# Patient Record
Sex: MALE | Race: WHITE | NOT HISPANIC OR LATINO | Employment: FULL TIME | URBAN - NONMETROPOLITAN AREA
[De-identification: names, ages, dates, MRNs, and addresses within clinical notes are randomized per-mention and may not be internally consistent; named-entity substitution may affect disease eponyms.]

---

## 2024-10-28 ENCOUNTER — APPOINTMENT (OUTPATIENT)
Dept: RADIOLOGY | Facility: CLINIC | Age: 19
End: 2024-10-28
Payer: COMMERCIAL

## 2024-10-28 ENCOUNTER — OFFICE VISIT (OUTPATIENT)
Dept: URGENT CARE | Facility: CLINIC | Age: 19
End: 2024-10-28
Payer: COMMERCIAL

## 2024-10-28 VITALS
DIASTOLIC BLOOD PRESSURE: 62 MMHG | HEART RATE: 78 BPM | HEIGHT: 76 IN | BODY MASS INDEX: 21.92 KG/M2 | SYSTOLIC BLOOD PRESSURE: 130 MMHG | WEIGHT: 180 LBS | RESPIRATION RATE: 18 BRPM | TEMPERATURE: 98.4 F | OXYGEN SATURATION: 98 %

## 2024-10-28 DIAGNOSIS — M79.642 HAND PAIN, LEFT: ICD-10-CM

## 2024-10-28 DIAGNOSIS — S52.502A CLOSED FRACTURE OF DISTAL END OF LEFT RADIUS, UNSPECIFIED FRACTURE MORPHOLOGY, INITIAL ENCOUNTER: Primary | ICD-10-CM

## 2024-10-28 DIAGNOSIS — S60.222A CONTUSION OF DORSUM OF LEFT HAND: ICD-10-CM

## 2024-10-28 DIAGNOSIS — M25.532 WRIST PAIN, ACUTE, LEFT: ICD-10-CM

## 2024-10-28 PROCEDURE — 73130 X-RAY EXAM OF HAND: CPT

## 2024-10-28 PROCEDURE — G0382 LEV 3 HOSP TYPE B ED VISIT: HCPCS

## 2024-10-28 PROCEDURE — 73110 X-RAY EXAM OF WRIST: CPT

## 2024-10-28 PROCEDURE — S9083 URGENT CARE CENTER GLOBAL: HCPCS

## 2024-10-28 RX ORDER — LEVOTHYROXINE SODIUM 25 UG/1
TABLET ORAL
COMMUNITY
Start: 2024-09-29

## 2024-10-28 NOTE — PATIENT INSTRUCTIONS
Xray initial interpretation:  Left wrist - concerned for distal radius avulsion fracture  Left hand - no acute osseous abnormality   Official radiology read pending - We will only notify you if there needs to be a change in your treatment plan.     Tylenol/Ibuprofen for pain  Wear wrist brace for support  Ice 20 minutes 3-4 times per day for 3 days  Insulate the skin from the ice to prevent frostbite  Rest and Elevate  Follow up with orthopedic - referral placed today    Follow up with PCP in 3-5 days.  Proceed to  ER if symptoms worsen.    If tests are performed, our office will contact you with results only if changes need to made to the care plan discussed with you at the visit. You can review your full results on [facility].

## 2024-10-28 NOTE — PROGRESS NOTES
[facility]        NAME: Mac Lea is a 18 y.o. male  : 2005    MRN: [id number]  DATE: 2024  TIME: 5:39 PM    Assessment and Plan   Closed fracture of distal end of left radius, unspecified fracture morphology, initial encounter [S52.502A]  1. Closed fracture of distal end of left radius, unspecified fracture morphology, initial encounter  XR wrist 3+ vw left    Ambulatory Referral to Orthopedic Surgery    Orthopedic injury treatment      2. Contusion of dorsum of left hand  XR hand 3+ vw left        Xray initial interpretation:  Left wrist - concerned for distal radius avulsion fracture  Left hand - no acute osseous abnormality   Official radiology read pending - We will only notify you if there needs to be a change in your treatment plan.       Patient Instructions   Xray initial interpretation:  Left wrist - concerned for distal radius avulsion fracture  Left hand - no acute osseous abnormality   Official radiology read pending - We will only notify you if there needs to be a change in your treatment plan.     Tylenol/Ibuprofen for pain  Wear wrist brace for support  Ice 20 minutes 3-4 times per day for 3 days  Insulate the skin from the ice to prevent frostbite  Rest and Elevate  Follow up with orthopedic - referral placed today    Follow up with PCP in 3-5 days.  Proceed to  ER if symptoms worsen.    If tests are performed, our office will contact you with results only if changes need to made to the care plan discussed with you at the visit. [facility]    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  Patient reports when he fell he overextended his left hand and wrist to brace the fall.  Patient has bruising and swelling with some  "abrasions to dorsum of left hand.  Patient has decreased range of motion to left hand and left wrist.  Patient reports pain on movement of left hand and left wrist.  Patient denies any numbness or tingling in left upper extremity.  Patient has positive pulses intact in left upper extremity.    Hand Injury         Review of Systems   Review of Systems   Constitutional: Negative.    HENT: Negative.     Respiratory: Negative.     Cardiovascular: Negative.    Gastrointestinal: Negative.    Musculoskeletal:  Positive for arthralgias (left hand and left wrist) and joint swelling (left hand and wrist).   Skin:  Positive for color change (bruising to left hand) and wound.         Current Medications       Current Outpatient Medications:     levothyroxine 25 mcg tablet, TAKE 1 TABLET BY MOUTH IN THE MORNING. (AT LEAST 30 MIN PRIOR TO BREAKFAST OR OTHER MEDS)., Disp: , Rfl:     Current Allergies     Allergies as of 10/28/2024 - Reviewed 10/28/2024   Allergen Reaction Noted    Penicillins Other (See Comments) 02/14/2017            The following portions of the patient's history were reviewed and updated as appropriate: allergies, current medications, past family history, past medical history, past social history, past surgical history and problem list.     Past Medical History:   Diagnosis Date    Disease of thyroid gland        Past Surgical History:   Procedure Laterality Date    FOREARM FRACTURE SURGERY         Family History   Problem Relation Age of Onset    No Known Problems Mother          Medications have been verified.        Objective   /62   Pulse 78   Temp 98.4 °F (36.9 °C)   Resp 18   Ht 6' 4\" (1.93 m)   Wt 81.6 kg (180 lb)   SpO2 98%   BMI 21.91 kg/m²        Physical Exam     Physical Exam  Vitals and nursing note reviewed.   Constitutional:       General: He is not in acute distress.     Appearance: Normal appearance. He is not ill-appearing.   HENT:      Head: Normocephalic.      Right Ear: " External ear normal.      Left Ear: External ear normal.      Nose: Nose normal. No congestion.      Mouth/Throat:      Mouth: Mucous membranes are moist.      Pharynx: No oropharyngeal exudate or posterior oropharyngeal erythema.   Eyes:      Extraocular Movements: Extraocular movements intact.      Conjunctiva/sclera: Conjunctivae normal.      Pupils: Pupils are equal, round, and reactive to light.   Cardiovascular:      Rate and Rhythm: Normal rate and regular rhythm.      Pulses: Normal pulses.      Heart sounds: Normal heart sounds.   Pulmonary:      Effort: Pulmonary effort is normal. No respiratory distress.      Breath sounds: Normal breath sounds. No stridor. No wheezing, rhonchi or rales.   Chest:      Chest wall: No tenderness.   Musculoskeletal:         General: Swelling, tenderness and signs of injury present. No deformity.      Right wrist: Normal.      Left wrist: Swelling, tenderness and bony tenderness present. No deformity or snuff box tenderness. Decreased range of motion. Normal pulse.      Left hand: Swelling, tenderness and bony tenderness present. No deformity. Decreased range of motion. Normal strength. Normal sensation. Normal capillary refill. Normal pulse.      Cervical back: Normal range of motion and neck supple.   Lymphadenopathy:      Cervical: No cervical adenopathy.   Skin:     General: Skin is warm and dry.      Capillary Refill: Capillary refill takes less than 2 seconds.   Neurological:      General: No focal deficit present.      Mental Status: He is alert and oriented to person, place, and time.   Psychiatric:         Mood and Affect: Mood normal.         Behavior: Behavior normal.       Orthopedic injury treatment    Date/Time: 10/28/2024 4:50 PM    Performed by: MARIBEL Pedraza  Authorized by: MARIBEL Pedraza    Patient Location:  [facility]  [place] Protocol:  procedure performed by consultantConsent: Verbal consent obtained.  Risks and benefits: risks,  benefits and alternatives were discussed  Consent given by: patient  Patient understanding: patient states understanding of the procedure being performed  Patient identity confirmed: verbally with patient    Injury location:  Wrist  Location details:  Left wrist  Injury type:  Fracture  Fracture type: distal radius    Fracture type: distal radius    Neurovascular status: Neurovascularly intact    Distal perfusion: normal    Neurological function: normal    Range of motion: reduced    Splint type:  Wrist (dynamic)  Neurovascular status: Neurovascularly intact    Distal perfusion: normal    Neurological function: normal    Range of motion: unchanged    Patient tolerance:  Patient tolerated the procedure well with no immediate complications